# Patient Record
Sex: MALE | Race: WHITE | NOT HISPANIC OR LATINO | URBAN - METROPOLITAN AREA
[De-identification: names, ages, dates, MRNs, and addresses within clinical notes are randomized per-mention and may not be internally consistent; named-entity substitution may affect disease eponyms.]

---

## 2022-04-06 ENCOUNTER — PREPPED CHART (OUTPATIENT)
Dept: URBAN - METROPOLITAN AREA CLINIC 24 | Facility: CLINIC | Age: 57
End: 2022-04-06

## 2022-04-12 ENCOUNTER — COMPREHENSIVE EXAM (OUTPATIENT)
Dept: URBAN - METROPOLITAN AREA CLINIC 24 | Facility: CLINIC | Age: 57
End: 2022-04-12

## 2022-04-12 DIAGNOSIS — H25.13: ICD-10-CM

## 2022-04-12 DIAGNOSIS — H52.4: ICD-10-CM

## 2022-04-12 DIAGNOSIS — H52.13: ICD-10-CM

## 2022-04-12 DIAGNOSIS — H11.122: ICD-10-CM

## 2022-04-12 DIAGNOSIS — H52.222: ICD-10-CM

## 2022-04-12 PROCEDURE — 92015 DETERMINE REFRACTIVE STATE: CPT

## 2022-04-12 PROCEDURE — 92014 COMPRE OPH EXAM EST PT 1/>: CPT

## 2022-04-12 ASSESSMENT — TONOMETRY
OD_IOP_MMHG: 15
OS_IOP_MMHG: 15

## 2022-04-12 ASSESSMENT — VISUAL ACUITY
OS_CC: 20/25+3
OU_CC: 20/20-1
OD_CC: 20/25+3
OU_CC: 20/25-

## 2022-04-12 NOTE — PATIENT DISCUSSION
Referral issued for lid legion excision with Dr. Roly Miller at [facility] site. Reviewed options with patient including excision vs. observation; patient wishes to proceed with excision.

## 2022-04-12 NOTE — PATIENT DISCUSSION
NO CL fitting performed today. Will re-assess CL after lid legion excision completed and healed x 1 month.

## 2022-04-22 ENCOUNTER — CONSULTATION/EVALUATION (OUTPATIENT)
Dept: URBAN - METROPOLITAN AREA CLINIC 52 | Facility: CLINIC | Age: 57
End: 2022-04-22

## 2022-04-22 DIAGNOSIS — H11.122: ICD-10-CM

## 2022-04-22 PROCEDURE — 92012 INTRM OPH EXAM EST PATIENT: CPT

## 2022-04-22 PROCEDURE — 92285 EXTERNAL OCULAR PHOTOGRAPHY: CPT

## 2022-04-22 ASSESSMENT — TONOMETRY
OS_IOP_MMHG: 15
OD_IOP_MMHG: 16

## 2022-04-22 ASSESSMENT — VISUAL ACUITY
OS_CC: 20/20
OD_CC: 20/20

## 2022-04-22 NOTE — PATIENT DISCUSSION
Per Dr. Maria M Krause she does not believe removing small cyst LL would be beneficial in improving patients symptoms. Do not recommend excision/removal at this time.